# Patient Record
Sex: FEMALE | Race: WHITE | NOT HISPANIC OR LATINO | Employment: OTHER | URBAN - METROPOLITAN AREA
[De-identification: names, ages, dates, MRNs, and addresses within clinical notes are randomized per-mention and may not be internally consistent; named-entity substitution may affect disease eponyms.]

---

## 2017-12-13 ENCOUNTER — ESTABLISHED COMPREHENSIVE EXAM (OUTPATIENT)
Dept: URBAN - METROPOLITAN AREA CLINIC 39 | Facility: CLINIC | Age: 67
End: 2017-12-13

## 2017-12-13 DIAGNOSIS — H04.123: ICD-10-CM

## 2017-12-13 DIAGNOSIS — H18.51: ICD-10-CM

## 2017-12-13 DIAGNOSIS — H40.013: ICD-10-CM

## 2017-12-13 PROCEDURE — 92015 DETERMINE REFRACTIVE STATE: CPT

## 2017-12-13 PROCEDURE — 92250 FUNDUS PHOTOGRAPHY W/I&R: CPT

## 2017-12-13 PROCEDURE — 92014 COMPRE OPH EXAM EST PT 1/>: CPT

## 2017-12-13 PROCEDURE — 1036F TOBACCO NON-USER: CPT

## 2017-12-13 PROCEDURE — 9222650 BILAT EXTENDED OPHTHALMOSCOPY, F/U

## 2017-12-13 PROCEDURE — G8427 DOCREV CUR MEDS BY ELIG CLIN: HCPCS

## 2017-12-13 PROCEDURE — G8785 BP SCRN NO PERF AT INTERVAL: HCPCS

## 2017-12-13 ASSESSMENT — VISUAL ACUITY
OS_BAT: 20/40
OS_SC: J6
OD_CC: J2
OD_SC: J8
OS_CC: J1
OD_BAT: 20/70
OS_PH: 20/25-1
OS_SC: 20/30
OD_SC: 20/30

## 2017-12-13 ASSESSMENT — TONOMETRY
OD_IOP_MMHG: 16
OS_IOP_MMHG: 17

## 2018-10-17 ENCOUNTER — IOP CHECK (OUTPATIENT)
Dept: URBAN - METROPOLITAN AREA CLINIC 39 | Facility: CLINIC | Age: 68
End: 2018-10-17

## 2018-10-17 DIAGNOSIS — H40.013: ICD-10-CM

## 2018-10-17 DIAGNOSIS — H04.123: ICD-10-CM

## 2018-10-17 PROCEDURE — G8785 BP SCRN NO PERF AT INTERVAL: HCPCS

## 2018-10-17 PROCEDURE — G8428 CUR MEDS NOT DOCUMENT: HCPCS

## 2018-10-17 PROCEDURE — 92012 INTRM OPH EXAM EST PATIENT: CPT

## 2018-10-17 PROCEDURE — G9903 PT SCRN TBCO ID AS NON USER: HCPCS

## 2018-10-17 PROCEDURE — 1036F TOBACCO NON-USER: CPT

## 2018-10-17 PROCEDURE — 92133 CPTRZD OPH DX IMG PST SGM ON: CPT

## 2018-10-17 ASSESSMENT — VISUAL ACUITY
OS_SC: 20/30-2
OD_BAT: 20/70
OS_BAT: 20/40
OD_SC: 20/20-2

## 2018-10-17 ASSESSMENT — TONOMETRY
OD_IOP_MMHG: 16
OS_IOP_MMHG: 17

## 2019-05-06 NOTE — PATIENT DISCUSSION
MILD DRY EYE, WITH CL WEAR OU: PRESCRIBED SYSTANE ARTIFICIAL TEARS BID - QID AND OMEGA-3 FISH OIL SUPPLEMENTS MAY HELP. REC REDUCE CL WEAR TIME AND REMOVE QHS OU. RETURN FOR FOLLOW-UP AS SCHEDULED OR SOONER IF SYMPTOMS WORSEN.

## 2019-10-10 ENCOUNTER — ESTABLISHED COMPREHENSIVE EXAM (OUTPATIENT)
Dept: URBAN - METROPOLITAN AREA CLINIC 43 | Facility: CLINIC | Age: 69
End: 2019-10-10

## 2019-10-10 DIAGNOSIS — H04.123: ICD-10-CM

## 2019-10-10 DIAGNOSIS — H40.013: ICD-10-CM

## 2019-10-10 DIAGNOSIS — H18.51: ICD-10-CM

## 2019-10-10 DIAGNOSIS — Z96.1: ICD-10-CM

## 2019-10-10 PROCEDURE — 92014 COMPRE OPH EXAM EST PT 1/>: CPT

## 2019-10-10 PROCEDURE — 92083 EXTENDED VISUAL FIELD XM: CPT

## 2019-10-10 PROCEDURE — 92250 FUNDUS PHOTOGRAPHY W/I&R: CPT

## 2019-10-10 ASSESSMENT — VISUAL ACUITY
OS_SC: 20/30+2
OS_BAT: 20/50
OD_BAT: 20/50
OS_SC: J12
OD_SC: 20/30+1
OD_SC: J12

## 2019-10-10 ASSESSMENT — TONOMETRY
OS_IOP_MMHG: 18
OD_IOP_MMHG: 18

## 2019-10-23 ENCOUNTER — RETINA CONSULT (OUTPATIENT)
Dept: URBAN - METROPOLITAN AREA CLINIC 39 | Facility: CLINIC | Age: 69
End: 2019-10-23

## 2019-10-23 DIAGNOSIS — H43.822: ICD-10-CM

## 2019-10-23 DIAGNOSIS — H35.61: ICD-10-CM

## 2019-10-23 DIAGNOSIS — H43.821: ICD-10-CM

## 2019-10-23 PROCEDURE — 92014 COMPRE OPH EXAM EST PT 1/>: CPT

## 2019-10-23 PROCEDURE — 92250 FUNDUS PHOTOGRAPHY W/I&R: CPT

## 2019-10-23 PROCEDURE — 9222550 BILAT EXTENDED OPHTHALMOSCOPY, FIRST

## 2019-10-23 ASSESSMENT — VISUAL ACUITY
OS_SC: 20/30-1
OD_SC: 20/30

## 2019-10-23 ASSESSMENT — TONOMETRY
OS_IOP_MMHG: 18
OD_IOP_MMHG: 16

## 2020-02-26 ENCOUNTER — ESTABLISHED COMPREHENSIVE EXAM (OUTPATIENT)
Dept: URBAN - METROPOLITAN AREA CLINIC 39 | Facility: CLINIC | Age: 70
End: 2020-02-26

## 2020-02-26 DIAGNOSIS — H43.821: ICD-10-CM

## 2020-02-26 DIAGNOSIS — H35.61: ICD-10-CM

## 2020-02-26 DIAGNOSIS — H35.30: ICD-10-CM

## 2020-02-26 DIAGNOSIS — H43.822: ICD-10-CM

## 2020-02-26 DIAGNOSIS — H43.813: ICD-10-CM

## 2020-02-26 PROCEDURE — 92250 FUNDUS PHOTOGRAPHY W/I&R: CPT

## 2020-02-26 PROCEDURE — 92014 COMPRE OPH EXAM EST PT 1/>: CPT

## 2020-02-26 ASSESSMENT — TONOMETRY
OD_IOP_MMHG: 18
OS_IOP_MMHG: 20

## 2020-02-26 ASSESSMENT — VISUAL ACUITY
OS_SC: 20/40-2
OD_SC: 20/30-2

## 2020-09-23 ENCOUNTER — ESTABLISHED COMPREHENSIVE EXAM (OUTPATIENT)
Dept: URBAN - METROPOLITAN AREA CLINIC 39 | Facility: CLINIC | Age: 70
End: 2020-09-23

## 2020-09-23 DIAGNOSIS — H43.813: ICD-10-CM

## 2020-09-23 DIAGNOSIS — H40.013: ICD-10-CM

## 2020-09-23 DIAGNOSIS — H35.62: ICD-10-CM

## 2020-09-23 DIAGNOSIS — H35.61: ICD-10-CM

## 2020-09-23 DIAGNOSIS — H04.123: ICD-10-CM

## 2020-09-23 DIAGNOSIS — H35.30: ICD-10-CM

## 2020-09-23 DIAGNOSIS — H43.822: ICD-10-CM

## 2020-09-23 DIAGNOSIS — H43.821: ICD-10-CM

## 2020-09-23 PROCEDURE — 92014 COMPRE OPH EXAM EST PT 1/>: CPT

## 2020-09-23 PROCEDURE — 92250 FUNDUS PHOTOGRAPHY W/I&R: CPT

## 2020-09-23 ASSESSMENT — VISUAL ACUITY
OS_SC: 20/25-1
OD_SC: 20/30-2

## 2020-09-23 ASSESSMENT — TONOMETRY
OD_IOP_MMHG: 16
OS_IOP_MMHG: 16

## 2020-10-15 ENCOUNTER — ESTABLISHED COMPREHENSIVE EXAM (OUTPATIENT)
Dept: URBAN - METROPOLITAN AREA CLINIC 43 | Facility: CLINIC | Age: 70
End: 2020-10-15

## 2020-10-15 DIAGNOSIS — H40.013: ICD-10-CM

## 2020-10-15 DIAGNOSIS — H04.123: ICD-10-CM

## 2020-10-15 DIAGNOSIS — H43.822: ICD-10-CM

## 2020-10-15 DIAGNOSIS — H35.30: ICD-10-CM

## 2020-10-15 DIAGNOSIS — H43.821: ICD-10-CM

## 2020-10-15 DIAGNOSIS — H43.813: ICD-10-CM

## 2020-10-15 PROCEDURE — 92014 COMPRE OPH EXAM EST PT 1/>: CPT

## 2020-10-15 PROCEDURE — 92133 CPTRZD OPH DX IMG PST SGM ON: CPT

## 2020-10-15 ASSESSMENT — TONOMETRY
OS_IOP_MMHG: 17
OD_IOP_MMHG: 17

## 2020-10-15 ASSESSMENT — VISUAL ACUITY
OS_SC: 20/30-1
OD_SC: 20/30-1

## 2021-04-07 ENCOUNTER — ESTABLISHED COMPREHENSIVE EXAM (OUTPATIENT)
Dept: URBAN - METROPOLITAN AREA CLINIC 39 | Facility: CLINIC | Age: 71
End: 2021-04-07

## 2021-04-07 DIAGNOSIS — H43.813: ICD-10-CM

## 2021-04-07 DIAGNOSIS — H43.822: ICD-10-CM

## 2021-04-07 DIAGNOSIS — H43.821: ICD-10-CM

## 2021-04-07 PROCEDURE — 99214 OFFICE O/P EST MOD 30 MIN: CPT

## 2021-04-07 PROCEDURE — 92250 FUNDUS PHOTOGRAPHY W/I&R: CPT

## 2021-04-07 ASSESSMENT — TONOMETRY
OD_IOP_MMHG: 16
OS_IOP_MMHG: 19

## 2021-04-07 ASSESSMENT — VISUAL ACUITY
OD_SC: 20/30-2
OS_SC: 20/25-1

## 2021-07-23 NOTE — PATIENT DISCUSSION
MYOPIA/ASTIGMATISM/PRESBYOPIA OU: UPDATED GLASSES RX AND CL RX RELEASED TO PATIENT. ORDERED TORIC CL TRIALS FOR PATIENT TO TRY AT [facility]/FOLLOW-UP. DISCUSSED PROPER WEAR/CARE/HYGIENE OF CLS. RTC IF DISCOMFORT.

## 2021-08-04 NOTE — PATIENT DISCUSSION
DRY EYE SYNDROME OU: EDUCATED PATIENT ON FINDINGS. PRESCRIBE CL-SAFE ARTIFICIAL TEARS BID/PRN OU. ADVISED TO RTC IF SI/SX WORSEN. MONITOR.

## 2021-08-04 NOTE — PATIENT DISCUSSION
ERM OS: EDUCATED PATIENT ON FINDINGS AND CONDITION. NOT VISUALLY SIGNIFICANT; NO TREATMENT INDICATED AT THIS TIME. MONITOR.

## 2021-08-04 NOTE — PATIENT DISCUSSION
PVD OU:  STABLE WITHOUT HOLES/TEARS/BREAKS ON THOROUGH [facility]. EDU PT ON FINDINGS AND SI/SX OF RD INCLUDING ^FLOATERS/FLASHES/VEIL OVER VISION AND ADVISED TO RTC IMMEDIATELY IF ANY OCCUR. MONITOR.

## 2021-10-06 ENCOUNTER — ESTABLISHED COMPREHENSIVE EXAM (OUTPATIENT)
Dept: URBAN - METROPOLITAN AREA CLINIC 39 | Facility: CLINIC | Age: 71
End: 2021-10-06

## 2021-10-06 DIAGNOSIS — H35.30: ICD-10-CM

## 2021-10-06 DIAGNOSIS — H35.033: ICD-10-CM

## 2021-10-06 DIAGNOSIS — H43.821: ICD-10-CM

## 2021-10-06 DIAGNOSIS — H43.813: ICD-10-CM

## 2021-10-06 DIAGNOSIS — H43.822: ICD-10-CM

## 2021-10-06 DIAGNOSIS — H35.09: ICD-10-CM

## 2021-10-06 PROCEDURE — 99214 OFFICE O/P EST MOD 30 MIN: CPT

## 2021-10-06 PROCEDURE — 92250 FUNDUS PHOTOGRAPHY W/I&R: CPT

## 2021-10-06 ASSESSMENT — VISUAL ACUITY
OD_SC: 20/40
OS_SC: 20/40

## 2021-10-06 ASSESSMENT — TONOMETRY
OS_IOP_MMHG: 14
OD_IOP_MMHG: 14

## 2021-10-20 ENCOUNTER — ESTABLISHED COMPREHENSIVE EXAM (OUTPATIENT)
Dept: URBAN - METROPOLITAN AREA CLINIC 39 | Facility: CLINIC | Age: 71
End: 2021-10-20

## 2021-10-20 DIAGNOSIS — Z96.1: ICD-10-CM

## 2021-10-20 DIAGNOSIS — H18.513: ICD-10-CM

## 2021-10-20 DIAGNOSIS — H40.013: ICD-10-CM

## 2021-10-20 DIAGNOSIS — H43.813: ICD-10-CM

## 2021-10-20 DIAGNOSIS — H43.821: ICD-10-CM

## 2021-10-20 DIAGNOSIS — H35.09: ICD-10-CM

## 2021-10-20 DIAGNOSIS — H43.822: ICD-10-CM

## 2021-10-20 DIAGNOSIS — H35.033: ICD-10-CM

## 2021-10-20 DIAGNOSIS — H35.30: ICD-10-CM

## 2021-10-20 DIAGNOSIS — H04.123: ICD-10-CM

## 2021-10-20 PROCEDURE — 92015 DETERMINE REFRACTIVE STATE: CPT

## 2021-10-20 PROCEDURE — 92014 COMPRE OPH EXAM EST PT 1/>: CPT

## 2021-10-20 ASSESSMENT — TONOMETRY
OS_IOP_MMHG: 17
OD_IOP_MMHG: 17

## 2021-10-20 ASSESSMENT — VISUAL ACUITY
OD_SC: 20/40-2
OU_CC: J1
OU_SC: J6
OU_SC: 20/40-1
OS_SC: J8
OS_CC: J2
OD_SC: J10
OS_SC: 20/40+1
OD_CC: J1

## 2022-04-27 ENCOUNTER — EMERGENCY VISIT (OUTPATIENT)
Dept: URBAN - METROPOLITAN AREA CLINIC 39 | Facility: CLINIC | Age: 72
End: 2022-04-27

## 2022-04-27 DIAGNOSIS — H35.30: ICD-10-CM

## 2022-04-27 DIAGNOSIS — H53.10: ICD-10-CM

## 2022-04-27 DIAGNOSIS — H43.823: ICD-10-CM

## 2022-04-27 DIAGNOSIS — H43.813: ICD-10-CM

## 2022-04-27 PROCEDURE — 92012 INTRM OPH EXAM EST PATIENT: CPT

## 2022-04-27 PROCEDURE — 92250 FUNDUS PHOTOGRAPHY W/I&R: CPT

## 2022-04-27 ASSESSMENT — VISUAL ACUITY
OS_SC: 20/25-2
OD_SC: 20/40+1
OU_SC: 20/30

## 2022-04-27 ASSESSMENT — TONOMETRY
OD_IOP_MMHG: 17
OS_IOP_MMHG: 17

## 2022-05-18 ENCOUNTER — ESTABLISHED PATIENT (OUTPATIENT)
Dept: URBAN - METROPOLITAN AREA CLINIC 39 | Facility: CLINIC | Age: 72
End: 2022-05-18

## 2022-05-18 DIAGNOSIS — H35.033: ICD-10-CM

## 2022-05-18 DIAGNOSIS — H43.813: ICD-10-CM

## 2022-05-18 DIAGNOSIS — H40.013: ICD-10-CM

## 2022-05-18 DIAGNOSIS — H43.823: ICD-10-CM

## 2022-05-18 DIAGNOSIS — H53.10: ICD-10-CM

## 2022-05-18 PROCEDURE — 99214 OFFICE O/P EST MOD 30 MIN: CPT

## 2022-05-18 PROCEDURE — 92250 FUNDUS PHOTOGRAPHY W/I&R: CPT

## 2022-05-18 ASSESSMENT — VISUAL ACUITY
OS_SC: 20/30+2
OD_SC: 20/40+2

## 2022-05-18 ASSESSMENT — TONOMETRY
OS_IOP_MMHG: 17
OD_IOP_MMHG: 16

## 2022-11-02 ENCOUNTER — COMPREHENSIVE EXAM (OUTPATIENT)
Dept: URBAN - METROPOLITAN AREA CLINIC 38 | Facility: CLINIC | Age: 72
End: 2022-11-02

## 2022-11-02 DIAGNOSIS — H43.813: ICD-10-CM

## 2022-11-02 DIAGNOSIS — H04.123: ICD-10-CM

## 2022-11-02 DIAGNOSIS — H35.09: ICD-10-CM

## 2022-11-02 DIAGNOSIS — H43.823: ICD-10-CM

## 2022-11-02 DIAGNOSIS — H35.033: ICD-10-CM

## 2022-11-02 DIAGNOSIS — H18.513: ICD-10-CM

## 2022-11-02 DIAGNOSIS — H53.10: ICD-10-CM

## 2022-11-02 DIAGNOSIS — H40.013: ICD-10-CM

## 2022-11-02 PROCEDURE — 92014 COMPRE OPH EXAM EST PT 1/>: CPT

## 2022-11-02 PROCEDURE — 92134 CPTRZ OPH DX IMG PST SGM RTA: CPT

## 2022-11-02 PROCEDURE — 92015 DETERMINE REFRACTIVE STATE: CPT

## 2022-11-02 ASSESSMENT — VISUAL ACUITY
OD_CC: J1+
OD_CC: 20/30+2
OS_CC: 20/40+2
OU_CC: J1+
OS_CC: J1+
OU_CC: 20/25+2

## 2022-11-02 ASSESSMENT — TONOMETRY
OS_IOP_MMHG: 19
OS_IOP_MMHG: 20
OD_IOP_MMHG: 20
OD_IOP_MMHG: 21

## 2023-05-03 ENCOUNTER — COMPREHENSIVE EXAM (OUTPATIENT)
Dept: URBAN - METROPOLITAN AREA CLINIC 39 | Facility: CLINIC | Age: 73
End: 2023-05-03

## 2023-05-03 DIAGNOSIS — H35.033: ICD-10-CM

## 2023-05-03 DIAGNOSIS — H35.30: ICD-10-CM

## 2023-05-03 DIAGNOSIS — H35.09: ICD-10-CM

## 2023-05-03 DIAGNOSIS — H43.823: ICD-10-CM

## 2023-05-03 DIAGNOSIS — H43.813: ICD-10-CM

## 2023-05-03 DIAGNOSIS — H53.10: ICD-10-CM

## 2023-05-03 PROCEDURE — 99214 OFFICE O/P EST MOD 30 MIN: CPT

## 2023-05-03 PROCEDURE — 92250 FUNDUS PHOTOGRAPHY W/I&R: CPT

## 2023-05-03 ASSESSMENT — TONOMETRY
OD_IOP_MMHG: 18
OS_IOP_MMHG: 18

## 2023-05-03 ASSESSMENT — VISUAL ACUITY
OD_SC: 20/40
OS_SC: 20/25+2

## 2023-11-08 ENCOUNTER — COMPREHENSIVE EXAM (OUTPATIENT)
Dept: URBAN - METROPOLITAN AREA CLINIC 39 | Facility: CLINIC | Age: 73
End: 2023-11-08

## 2023-11-08 DIAGNOSIS — Z96.1: ICD-10-CM

## 2023-11-08 DIAGNOSIS — H43.823: ICD-10-CM

## 2023-11-08 DIAGNOSIS — H18.513: ICD-10-CM

## 2023-11-08 DIAGNOSIS — H35.09: ICD-10-CM

## 2023-11-08 DIAGNOSIS — H53.10: ICD-10-CM

## 2023-11-08 DIAGNOSIS — H04.123: ICD-10-CM

## 2023-11-08 DIAGNOSIS — H35.30: ICD-10-CM

## 2023-11-08 DIAGNOSIS — H43.813: ICD-10-CM

## 2023-11-08 DIAGNOSIS — H35.033: ICD-10-CM

## 2023-11-08 DIAGNOSIS — H40.013: ICD-10-CM

## 2023-11-08 PROCEDURE — 92015 DETERMINE REFRACTIVE STATE: CPT

## 2023-11-08 PROCEDURE — 92134 CPTRZ OPH DX IMG PST SGM RTA: CPT

## 2023-11-08 PROCEDURE — 92014 COMPRE OPH EXAM EST PT 1/>: CPT

## 2023-11-08 ASSESSMENT — VISUAL ACUITY
OS_CC: J1+
OS_CC: 20/30-2
OU_CC: J1+
OD_CC: 20/25-1
OU_CC: 20/20-2
OD_CC: J1+

## 2023-11-08 ASSESSMENT — TONOMETRY
OD_IOP_MMHG: 22
OS_IOP_MMHG: 18

## 2024-05-21 ENCOUNTER — COMPREHENSIVE EXAM (OUTPATIENT)
Dept: URBAN - METROPOLITAN AREA CLINIC 39 | Facility: CLINIC | Age: 74
End: 2024-05-21

## 2024-05-21 DIAGNOSIS — H43.823: ICD-10-CM

## 2024-05-21 DIAGNOSIS — H40.013: ICD-10-CM

## 2024-05-21 DIAGNOSIS — H04.123: ICD-10-CM

## 2024-05-21 DIAGNOSIS — H35.09: ICD-10-CM

## 2024-05-21 DIAGNOSIS — H35.30: ICD-10-CM

## 2024-05-21 DIAGNOSIS — H35.033: ICD-10-CM

## 2024-05-21 DIAGNOSIS — H43.813: ICD-10-CM

## 2024-05-21 PROCEDURE — 92250 FUNDUS PHOTOGRAPHY W/I&R: CPT

## 2024-05-21 PROCEDURE — 99214 OFFICE O/P EST MOD 30 MIN: CPT

## 2024-05-21 ASSESSMENT — TONOMETRY
OS_IOP_MMHG: 18
OD_IOP_MMHG: 18

## 2024-05-21 ASSESSMENT — VISUAL ACUITY
OS_SC: 20/20-1
OD_PH: 20/25
OD_SC: 20/40-1

## 2024-11-13 ENCOUNTER — COMPREHENSIVE EXAM (OUTPATIENT)
Dept: URBAN - METROPOLITAN AREA CLINIC 39 | Facility: CLINIC | Age: 74
End: 2024-11-13

## 2024-11-13 DIAGNOSIS — H35.3131: ICD-10-CM

## 2024-11-13 DIAGNOSIS — H43.823: ICD-10-CM

## 2024-11-13 DIAGNOSIS — H35.09: ICD-10-CM

## 2024-11-13 DIAGNOSIS — H04.123: ICD-10-CM

## 2024-11-13 DIAGNOSIS — H35.033: ICD-10-CM

## 2024-11-13 DIAGNOSIS — Z96.1: ICD-10-CM

## 2024-11-13 DIAGNOSIS — H43.813: ICD-10-CM

## 2024-11-13 DIAGNOSIS — H40.013: ICD-10-CM

## 2024-11-13 DIAGNOSIS — H35.30: ICD-10-CM

## 2024-11-13 PROCEDURE — 92014 COMPRE OPH EXAM EST PT 1/>: CPT

## 2024-11-13 PROCEDURE — 92015 DETERMINE REFRACTIVE STATE: CPT

## 2025-05-20 ENCOUNTER — COMPREHENSIVE EXAM (OUTPATIENT)
Age: 75
End: 2025-05-20

## 2025-05-20 DIAGNOSIS — H43.823: ICD-10-CM

## 2025-05-20 DIAGNOSIS — H35.30: ICD-10-CM

## 2025-05-20 DIAGNOSIS — H35.723: ICD-10-CM

## 2025-05-20 DIAGNOSIS — H35.3132: ICD-10-CM

## 2025-05-20 DIAGNOSIS — H40.013: ICD-10-CM

## 2025-05-20 DIAGNOSIS — H35.033: ICD-10-CM

## 2025-05-20 DIAGNOSIS — H43.813: ICD-10-CM

## 2025-05-20 DIAGNOSIS — H35.09: ICD-10-CM

## 2025-05-20 DIAGNOSIS — H04.123: ICD-10-CM

## 2025-05-20 PROCEDURE — 99213 OFFICE O/P EST LOW 20 MIN: CPT

## 2025-05-20 PROCEDURE — 92250 FUNDUS PHOTOGRAPHY W/I&R: CPT
